# Patient Record
Sex: FEMALE | Race: ASIAN | NOT HISPANIC OR LATINO | URBAN - METROPOLITAN AREA
[De-identification: names, ages, dates, MRNs, and addresses within clinical notes are randomized per-mention and may not be internally consistent; named-entity substitution may affect disease eponyms.]

---

## 2021-02-08 ENCOUNTER — EMERGENCY (EMERGENCY)
Facility: HOSPITAL | Age: 61
LOS: 1 days | Discharge: HOME | End: 2021-02-08
Attending: EMERGENCY MEDICINE | Admitting: EMERGENCY MEDICINE
Payer: MEDICAID

## 2021-02-08 VITALS
TEMPERATURE: 98 F | RESPIRATION RATE: 18 BRPM | DIASTOLIC BLOOD PRESSURE: 65 MMHG | OXYGEN SATURATION: 98 % | HEART RATE: 85 BPM | SYSTOLIC BLOOD PRESSURE: 170 MMHG

## 2021-02-08 DIAGNOSIS — Z90.49 ACQUIRED ABSENCE OF OTHER SPECIFIED PARTS OF DIGESTIVE TRACT: Chronic | ICD-10-CM

## 2021-02-08 LAB
ALBUMIN SERPL ELPH-MCNC: 4.7 G/DL — SIGNIFICANT CHANGE UP (ref 3.3–5)
ALP SERPL-CCNC: 55 U/L — SIGNIFICANT CHANGE UP (ref 40–120)
ALT FLD-CCNC: 17 U/L — SIGNIFICANT CHANGE UP (ref 4–33)
ANION GAP SERPL CALC-SCNC: 11 MMOL/L — SIGNIFICANT CHANGE UP (ref 7–14)
AST SERPL-CCNC: 20 U/L — SIGNIFICANT CHANGE UP (ref 4–32)
BASOPHILS # BLD AUTO: 0.03 K/UL — SIGNIFICANT CHANGE UP (ref 0–0.2)
BASOPHILS NFR BLD AUTO: 0.5 % — SIGNIFICANT CHANGE UP (ref 0–2)
BILIRUB SERPL-MCNC: 0.4 MG/DL — SIGNIFICANT CHANGE UP (ref 0.2–1.2)
BUN SERPL-MCNC: 16 MG/DL — SIGNIFICANT CHANGE UP (ref 7–23)
CALCIUM SERPL-MCNC: 9.9 MG/DL — SIGNIFICANT CHANGE UP (ref 8.4–10.5)
CHLORIDE SERPL-SCNC: 105 MMOL/L — SIGNIFICANT CHANGE UP (ref 98–107)
CO2 SERPL-SCNC: 26 MMOL/L — SIGNIFICANT CHANGE UP (ref 22–31)
CREAT SERPL-MCNC: 0.69 MG/DL — SIGNIFICANT CHANGE UP (ref 0.5–1.3)
EOSINOPHIL # BLD AUTO: 0.02 K/UL — SIGNIFICANT CHANGE UP (ref 0–0.5)
EOSINOPHIL NFR BLD AUTO: 0.3 % — SIGNIFICANT CHANGE UP (ref 0–6)
GLUCOSE SERPL-MCNC: 106 MG/DL — HIGH (ref 70–99)
HCT VFR BLD CALC: 36.4 % — SIGNIFICANT CHANGE UP (ref 34.5–45)
HGB BLD-MCNC: 12.1 G/DL — SIGNIFICANT CHANGE UP (ref 11.5–15.5)
IANC: 4.74 K/UL — SIGNIFICANT CHANGE UP (ref 1.5–8.5)
IMM GRANULOCYTES NFR BLD AUTO: 0.2 % — SIGNIFICANT CHANGE UP (ref 0–1.5)
LYMPHOCYTES # BLD AUTO: 1.35 K/UL — SIGNIFICANT CHANGE UP (ref 1–3.3)
LYMPHOCYTES # BLD AUTO: 21 % — SIGNIFICANT CHANGE UP (ref 13–44)
MCHC RBC-ENTMCNC: 29.6 PG — SIGNIFICANT CHANGE UP (ref 27–34)
MCHC RBC-ENTMCNC: 33.2 GM/DL — SIGNIFICANT CHANGE UP (ref 32–36)
MCV RBC AUTO: 89 FL — SIGNIFICANT CHANGE UP (ref 80–100)
MONOCYTES # BLD AUTO: 0.28 K/UL — SIGNIFICANT CHANGE UP (ref 0–0.9)
MONOCYTES NFR BLD AUTO: 4.4 % — SIGNIFICANT CHANGE UP (ref 2–14)
NEUTROPHILS # BLD AUTO: 4.74 K/UL — SIGNIFICANT CHANGE UP (ref 1.8–7.4)
NEUTROPHILS NFR BLD AUTO: 73.6 % — SIGNIFICANT CHANGE UP (ref 43–77)
NRBC # BLD: 0 /100 WBCS — SIGNIFICANT CHANGE UP
NRBC # FLD: 0 K/UL — SIGNIFICANT CHANGE UP
PLATELET # BLD AUTO: 284 K/UL — SIGNIFICANT CHANGE UP (ref 150–400)
POTASSIUM SERPL-MCNC: 4.1 MMOL/L — SIGNIFICANT CHANGE UP (ref 3.5–5.3)
POTASSIUM SERPL-SCNC: 4.1 MMOL/L — SIGNIFICANT CHANGE UP (ref 3.5–5.3)
PROT SERPL-MCNC: 7.1 G/DL — SIGNIFICANT CHANGE UP (ref 6–8.3)
RBC # BLD: 4.09 M/UL — SIGNIFICANT CHANGE UP (ref 3.8–5.2)
RBC # FLD: 13.4 % — SIGNIFICANT CHANGE UP (ref 10.3–14.5)
SARS-COV-2 RNA SPEC QL NAA+PROBE: SIGNIFICANT CHANGE UP
SODIUM SERPL-SCNC: 142 MMOL/L — SIGNIFICANT CHANGE UP (ref 135–145)
TROPONIN T, HIGH SENSITIVITY RESULT: <6 NG/L — SIGNIFICANT CHANGE UP
TROPONIN T, HIGH SENSITIVITY RESULT: <6 NG/L — SIGNIFICANT CHANGE UP
WBC # BLD: 6.43 K/UL — SIGNIFICANT CHANGE UP (ref 3.8–10.5)
WBC # FLD AUTO: 6.43 K/UL — SIGNIFICANT CHANGE UP (ref 3.8–10.5)

## 2021-02-08 PROCEDURE — 71046 X-RAY EXAM CHEST 2 VIEWS: CPT | Mod: 26

## 2021-02-08 PROCEDURE — 99220: CPT

## 2021-02-08 RX ORDER — FENOFIBRATE,MICRONIZED 130 MG
134 CAPSULE ORAL DAILY
Refills: 0 | Status: DISCONTINUED | OUTPATIENT
Start: 2021-02-08 | End: 2021-02-09

## 2021-02-08 RX ORDER — DEXTROSE 50 % IN WATER 50 %
15 SYRINGE (ML) INTRAVENOUS ONCE
Refills: 0 | Status: DISCONTINUED | OUTPATIENT
Start: 2021-02-08 | End: 2021-02-11

## 2021-02-08 RX ORDER — SODIUM CHLORIDE 9 MG/ML
1000 INJECTION, SOLUTION INTRAVENOUS
Refills: 0 | Status: DISCONTINUED | OUTPATIENT
Start: 2021-02-08 | End: 2021-02-11

## 2021-02-08 RX ORDER — GLUCAGON INJECTION, SOLUTION 0.5 MG/.1ML
1 INJECTION, SOLUTION SUBCUTANEOUS ONCE
Refills: 0 | Status: DISCONTINUED | OUTPATIENT
Start: 2021-02-08 | End: 2021-02-11

## 2021-02-08 RX ORDER — DEXTROSE 50 % IN WATER 50 %
25 SYRINGE (ML) INTRAVENOUS ONCE
Refills: 0 | Status: DISCONTINUED | OUTPATIENT
Start: 2021-02-08 | End: 2021-02-11

## 2021-02-08 RX ORDER — DEXTROSE 50 % IN WATER 50 %
12.5 SYRINGE (ML) INTRAVENOUS ONCE
Refills: 0 | Status: DISCONTINUED | OUTPATIENT
Start: 2021-02-08 | End: 2021-02-11

## 2021-02-08 RX ORDER — INSULIN LISPRO 100/ML
VIAL (ML) SUBCUTANEOUS AT BEDTIME
Refills: 0 | Status: DISCONTINUED | OUTPATIENT
Start: 2021-02-08 | End: 2021-02-11

## 2021-02-08 RX ORDER — INSULIN LISPRO 100/ML
VIAL (ML) SUBCUTANEOUS
Refills: 0 | Status: DISCONTINUED | OUTPATIENT
Start: 2021-02-08 | End: 2021-02-11

## 2021-02-08 RX ORDER — ATORVASTATIN CALCIUM 80 MG/1
20 TABLET, FILM COATED ORAL AT BEDTIME
Refills: 0 | Status: DISCONTINUED | OUTPATIENT
Start: 2021-02-08 | End: 2021-02-11

## 2021-02-08 RX ORDER — ASPIRIN/CALCIUM CARB/MAGNESIUM 324 MG
81 TABLET ORAL DAILY
Refills: 0 | Status: DISCONTINUED | OUTPATIENT
Start: 2021-02-08 | End: 2021-02-11

## 2021-02-08 RX ADMIN — ATORVASTATIN CALCIUM 20 MILLIGRAM(S): 80 TABLET, FILM COATED ORAL at 20:39

## 2021-02-08 NOTE — ED PROVIDER NOTE - OBJECTIVE STATEMENT
60 y/f with PMH HTN, DM ,HLD presenting with chief c/o chest pain. Patient reports intermittent precordial chest pressure since last Thursday that radiates to the left shoulder and mid-back. Patient reports pain has been intermittent, began while cooking. She reports SOB with stairs since then. Symptoms improved with lying down. Associated symptoms include dry cough. (+) premature CAD in father at 59yo. (-)cigarette smoking. 60 y/f with PMH HTN, DM ,HLD presenting with chief c/o chest pain. Patient reports intermittent precordial chest pressure since last Thursday that radiates to the left shoulder and mid-back. Patient reports pain has been intermittent, began while cooking. She reports SOB with stairs since then. Symptoms improved with lying down. Associated symptoms include dry cough. (+) premature CAD in father at 61yo. (-)cigarette smoking.    Mandarin  Virgen [id number] utilized.

## 2021-02-08 NOTE — ED CDU PROVIDER INITIAL DAY NOTE - FAMILY HISTORY
Father  Still living? Unknown  Family history of coronary artery bypass graft, Age at diagnosis: Age Unknown   Father  Still living? Unknown  Family history of CVA, Age at diagnosis: Age Unknown  Family history of hyperglycemia, Age at diagnosis: Age Unknown  Family history of coronary artery bypass graft, Age at diagnosis: Age Unknown

## 2021-02-08 NOTE — ED CDU PROVIDER INITIAL DAY NOTE - ATTENDING CONTRIBUTION TO CARE
I have made the decision to admit this patient to the CDU as documented in my Provider note I have reviewed the note  written by Harsha DAS, on that visit day. I have supervised and participated as necessary in the performance of procedures indicated for patient management and was available at all phases of the patient´s visit when needed.    Please see my provider note for the details of my decision to admit  Vital Signs Last 24 Hrs  T(F): 97.7 HR: 72 BP: 110/70 RR: 15 SpO2: 100% (09 Feb 2021 14:10) (97% - 100%)  PE unchanged at time of admission  Plan  Patient with multiple risk factors to be admitted for serial trops echo and provocative testing

## 2021-02-08 NOTE — ED CDU PROVIDER INITIAL DAY NOTE - ENMT, MLM
Constipation.  Resolved with a fairly powerful laxative.  Now having some loose stool, like related to the laxative.  I recommend plenty of fluids.  Continue his regular diet.  The looser stools should now dissipate.  If he develops any constitutional symptoms such as fever, chills, cough, or other symptoms he is going to let us know.  For the hemorrhoids I recommend sitz bath, Preparation H, or just Vaseline ointment.   Airway patent. Nasal mucosa clear. Mouth with normal mucosa. Throat has no vesicles, no oropharyngeal exudates and uvula is midline.

## 2021-02-08 NOTE — ED PROVIDER NOTE - ATTENDING CONTRIBUTION TO CARE
I have seen and examined the patient on the patient´s visit date. I have reviewed the note written by Blake Treadwell [facility], on that visit day. I have supervised and participated as necessary in the performance of procedures indicated for patient management and was available at all phases of the patient´s visit when needed. We discussed the history, physical exam findings, mnagement plan, and  medical decision making. I have made my additons, exceptions, and revisions within the chart and I agree with H and P as documented in its entirety. The data and my interpretation of any data collected from labs, interventions and imaging appear below as well as my independent medical decision making and considerations    The patient is a 60y Female who has a past medical and surgery history of  HLD DM HTN History of appendectomy and history of premature CAD in father (had CABG at her age) PTED with chest pain as described   PTED with   Vital Signs Last 24 Hrs  T(F): 98 HR: 85 BP: 170/65 RR: 18 SpO2: 98% (08 Feb 2021 11:24) (98% - 98%)  PE: as described; my additions and exceptions are noted in the chart    DATA:  EKG:   LAB:                        12.1   6.43  )-----------( 284      ( 08 Feb 2021 13:43 )             36.4   Mean Cell Volume: 89.0 fL (02-08-21 @ 13:43 Auto Neutrophil %: 73.6 % (02-08-21 @ 13:43 Auto Eosinophil %: 0.3 % (02-08-21 @ 13:43)  02-08    142  |  105  |  16  ----------------------------<  106<H>  4.1   |  26  |  0.69    Ca    9.9      08 Feb 2021 13:43    TPro  7.1  /  Alb  4.7  /  TBili  0.4  /  DBili  x   /  AST  20  /  ALT  17  /  AlkPhos  55  02-08        IMPRESSION/RISK:  Dx=Chest pain    Consideration include  Plan  ASA  serial trops/EKG  Admit to minimum CDU based on risk factors/and results

## 2021-02-08 NOTE — ED PROVIDER NOTE - PROGRESS NOTE DETAILS
Eben DEGROOT: Given several risk factors for ACS patient was placed in CDU for further ischemic workup. Discussed with MIKAYLA Bui who has accepted patient.

## 2021-02-08 NOTE — ED CDU PROVIDER INITIAL DAY NOTE - MEDICAL DECISION MAKING DETAILS
61 yo non smoker F with PMHX of DM on metformin, HLD, HTN, here with c/o sternal to left sided chest pain radiating to back and left shoulder x 5 days, first occurred while reaching to the right side of her body with left arm while cooking, since then has been having SOB and CP with ambulating up her stairs, notes nausea with eating x 1 episode. however no cp/ sob associated with food intake. + sometimes dry cough in the evening time.  In ED labs wnl, trop <6, EKG NSR non ischemic. CXR. clear lungs   + risk factors, CAD in father at age 60.   CDU for tele, stress, echo and TDD eval tomorrow.

## 2021-02-08 NOTE — ED CDU PROVIDER INITIAL DAY NOTE - OBJECTIVE STATEMENT
60 y/f with PMH HTN, DM ,HLD presenting with chief c/o chest pain. Patient reports intermittent precordial chest pressure since last Thursday that radiates to the left shoulder and mid-back. Patient reports pain has been intermittent, began while cooking. She reports SOB with stairs since then. Symptoms improved with lying down. Associated symptoms include dry cough. (+) premature CAD in father at 59yo. (-)cigarette smoking.    CDU MIKAYLA Bui: [facility]  Jakob used for translation ID [id number]  59 yo non smoker F with PMHX of DM on metformin, HLD, HTN, here with c/o sternal to left sided chest pain radiating to back and left shoulder x 5 days, first occurred while reaching to the right side of her body with left arm while cooking, since then has been having SOB and CP with ambulating up her stairs, notes nausea with eating x 1 episode. however no cp/ sob associated with food intake. + sometimes dry cough in the evening time. No associated Diaphoresis, nausea or vomiting with pain. No lightheadedness or dizziness, no weakness numbness or tingling. Denies fever, chills, abdominal pain, travel leg swelling.

## 2021-02-08 NOTE — ED ADULT NURSE NOTE - OBJECTIVE STATEMENT
pt received to intake 4, aox3.  pt c/o intermittent chest pain with SOB on exertion x a few days..  SL placed, labs sent, pt appears comfortable.  awaiting further orders, will monitor

## 2021-02-09 VITALS
RESPIRATION RATE: 15 BRPM | HEART RATE: 72 BPM | DIASTOLIC BLOOD PRESSURE: 70 MMHG | TEMPERATURE: 98 F | OXYGEN SATURATION: 100 % | SYSTOLIC BLOOD PRESSURE: 110 MMHG

## 2021-02-09 PROCEDURE — 93018 CV STRESS TEST I&R ONLY: CPT | Mod: GC

## 2021-02-09 PROCEDURE — 99217: CPT

## 2021-02-09 PROCEDURE — 78452 HT MUSCLE IMAGE SPECT MULT: CPT | Mod: 26

## 2021-02-09 PROCEDURE — 93016 CV STRESS TEST SUPVJ ONLY: CPT | Mod: GC

## 2021-02-09 PROCEDURE — 99284 EMERGENCY DEPT VISIT MOD MDM: CPT

## 2021-02-09 PROCEDURE — 93306 TTE W/DOPPLER COMPLETE: CPT | Mod: 26

## 2021-02-09 RX ORDER — FENOFIBRATE,MICRONIZED 130 MG
145 CAPSULE ORAL DAILY
Refills: 0 | Status: DISCONTINUED | OUTPATIENT
Start: 2021-02-09 | End: 2021-02-09

## 2021-02-09 RX ORDER — FENOFIBRATE,MICRONIZED 130 MG
145 CAPSULE ORAL DAILY
Refills: 0 | Status: DISCONTINUED | OUTPATIENT
Start: 2021-02-09 | End: 2021-02-11

## 2021-02-09 RX ADMIN — Medication 145 MILLIGRAM(S): at 12:03

## 2021-02-09 RX ADMIN — Medication 81 MILLIGRAM(S): at 12:03

## 2021-02-09 RX ADMIN — Medication 1: at 13:52

## 2021-02-09 NOTE — CONSULT NOTE ADULT - ASSESSMENT
Patient is a 61 yo Mandarin-speaking woman with HTN, hyperlipidemia, DM2, and significant family history of early CAD (father, early 50s) presented to the [facility] ED with complaints of progressively worsening substernal chest pressure, at times related to exertion.  Denies fevers/chills, cough, orthopnea/PND, edema, syncope.No tobacco or drug use. No recent illness. No history of DVT or PE.    12-lead ECG and bedside telemetry --> SR, no events  Cardiac biomarkers flat.  COVID PCR negative.  CXR unremarkable for acute findings.    At the time of my evaluation, the patient appeared clinically and hemodynamically stable.  Unremarkable physical exam.  She appeared euvolemic on my exam.    Patient already scheduled for echocardiogram and nuclear stress test by the CDU team.    Echocardiogram noted normal biventricular systolic function, mild pulmonary HTN.  No evidence of ischemia noted on the nuclear stress test.    From the cardiac perspective, the patient may be discharged to home without the need for additional inpatient cardiac testing.  She can f/u with me within 1-2 weeks for further evaluation as needed.

## 2021-02-09 NOTE — ED CDU PROVIDER DISPOSITION NOTE - CARE PROVIDER_API CALL
Moody Perez (MD; PhD)  Cardiology; Internal Medicine; Vascular Medicine  [id number] [place] [address], [id number]  [place] [address]  Phone: [phone]  Fax: [phone]  Follow Up Time: 7-10 Days

## 2021-02-09 NOTE — ED CDU PROVIDER SUBSEQUENT DAY NOTE - FAMILY HISTORY
Father  Still living? Unknown  Family history of CVA, Age at diagnosis: Age Unknown  Family history of hyperglycemia, Age at diagnosis: Age Unknown  Family history of coronary artery bypass graft, Age at diagnosis: Age Unknown

## 2021-02-09 NOTE — ED CDU PROVIDER DISPOSITION NOTE - NSFOLLOWUPINSTRUCTIONS_ED_ALL_ED_FT
Follow up with your primary care doctor within 48-72 hours for a post-hospital visit. Show copies of your reports given to you. Follow up with Dr. Perez (Cardiology) in 1-2 weeks for further evaluation. We recommend you take Aspirin 81mg over the counter daily until further evaluation.  Take all of your other medications as previously prescribed. Worsening or continued chest pain, shortness of breath, weakness,  or any other concerns return to ED.

## 2021-02-09 NOTE — ED CDU PROVIDER DISPOSITION NOTE - CLINICAL COURSE
61 yo non smoker F with PMHX of DM on metformin, HLD, HTN, here with c/o sternal to left sided chest pain radiating to back and left shoulder x 5 days. +Fam hx of early CAD, sent to CDU for tele doc eval, stress and echo. Echo c/w mild pulm HTN, stress test wnl. Pt seen and cleared by Dr. Perez for outpatient follow up.

## 2021-02-09 NOTE — CONSULT NOTE ADULT - SUBJECTIVE AND OBJECTIVE BOX
Cardiology/Vascular Medicine Inpatient Consultation Note    HISTORY OF PRESENT ILLNESS:    Patient is a 59 yo Mandarin-speaking woman with HTN, hyperlipidemia, DM2, and significant family history of early CAD (father, early 50s) presented to the [facility] ED with complaints of progressively worsening substernal chest pressure, at times related to exertion.  Denies fevers/chills, cough, orthopnea/PND, edema, syncope.No tobacco or drug use. No recent illness. No history of DVT or PE.    12-lead ECG and bedside telemetry --> SR, no events  Cardiac biomarkers flat.  COVID PCR negative.  CXR unremarkable for acute findings.    At the time of my evaluation, the patient appeared clinically and hemodynamically stable.  Unremarkable physical exam.  She appeared euvolemic on my exam.    Patient already scheduled for echocardiogram and nuclear stress test by the CDU team.    Echocardiogram noted normal biventricular systolic function, mild pulmonary HTN.  No evidence of ischemia noted on the nuclear stress test.    From the cardiac perspective, the patient may be discharged to home without the need for additional inpatient cardiac testing.  She can f/u with me within 1-2 weeks for further evaluation as needed.      Allergies  No Known Allergies    MEDICATIONS:  aspirin enteric coated 81 milliGRAM(s) Oral daily  atorvastatin 20 milliGRAM(s) Oral at bedtime  dextrose 40% Gel 15 Gram(s) Oral once  dextrose 50% Injectable 25 Gram(s) IV Push once  dextrose 50% Injectable 12.5 Gram(s) IV Push once  dextrose 50% Injectable 25 Gram(s) IV Push once  fenofibrate Tablet 145 milliGRAM(s) Oral daily  glucagon  Injectable 1 milliGRAM(s) IntraMuscular once  insulin lispro (ADMELOG) corrective regimen sliding scale   SubCutaneous three times a day before meals  insulin lispro (ADMELOG) corrective regimen sliding scale   SubCutaneous at bedtime  dextrose 5%. 1000 milliLiter(s) IV Continuous <Continuous>  dextrose 5%. 1000 milliLiter(s) IV Continuous <Continuous>    PAST MEDICAL & SURGICAL HISTORY:  HLD (hyperlipidemia)  DM (diabetes mellitus)  HTN (hypertension)  History of appendectomy    FAMILY HISTORY:  Family history of hyperglycemia (Father)  Family history of CVA (Father)  Family history of coronary artery bypass graft (Father)    SOCIAL HISTORY:    As above, as per chart notes    REVIEW OF SYSTEMS:  As above, as per chart notes    PHYSICAL EXAM:  T(C): 36.7 (02-09-21 @ 10:33), Max: 37 (21 @ 01:18)  HR: 65 (21 @ 10:33) (64 - 84)  BP: 120/75 (21 @ 10:33) (104/58 - 156/95)  RR: 17 (21 @ 10:33) (16 - 17)  SpO2: 100% (21 @ 10:33) (97% - 100%)    Appearance: NAD  HEENT:   No JVD  Cardiovascular: Normal S1 S2, No JVD, No murmurs, No edema  Respiratory: Lungs clear to auscultation	  Psychiatry: Awake, alert  Gastrointestinal:  Soft, Non-tender, + BS	  Neurologic: Non-focal  Extremities: No edema      LABS:	 	                          12.1   6.43  )-----------( 284      ( 2021 13:43 )             36.4     02    142  |  105  |  16  ----------------------------<  106<H>  4.1   |  26  |  0.69    Ca    9.9      2021 13:43    TPro  7.1  /  Alb  4.7  /  TBili  0.4  /  DBili  x   /  AST  20  /  ALT  17  /  AlkPhos  55  -08    < from: Xray Chest 2 Views PA/Lat (21 @ 13:59) >    EXAM:  XR CHEST PA LAT 2V        PROCEDURE DATE:  2021         INTERPRETATION:  CLINICAL INDICATION: chest pain    EXAM:  Frontal and lateral chest from 2021 at 1359. No prior chest x-ray available at this institution for comparison.    IMPRESSION:  Clear lungs. No pleural effusions or pneumothorax.    Cardiac and mediastinal silhouettes within normal limits.    Trachea midline.    Unremarkable osseous structures.              JAMIE DING MD; Attending Radiologist  This document has been electronically signed. 2021  3:10PM    < end of copied text >  < from: Transthoracic Echocardiogram (21 @ 06:45) >    Patient name: TRACY ERIC  YOB: 1960   Age: 60 (F)   MR#: [id number]  Study Date: 2021  Location: O/PSonographer: MARY Vigil  Study quality: Technically good  Referring Physician: Not Available Doctor, MD  Blood Pressure: 104/58 mmHg  Height: 152 cm  Weight: 47 kg  BSA: 1.4 m2  ------------------------------------------------------------------------  PROCEDURE: Transthoracic echocardiogram with 2-D, M-Mode  and complete spectral and color flow Doppler.  INDICATION: Chest pain, unspecified (R07.9)  ------------------------------------------------------------------------  DIMENSIONS:  Dimensions:     Normal Values:  LA:     2.8 cm    2.0 - 4.0 cm  Ao:     2.6 cm    2.0 - 3.8 cm  SEPTUM: 0.6 cm    0.6 - 1.2 cm  PWT:    0.7 cm    0.6 - 1.1 cm  LVIDd:  4.0 cm    3.0 - 5.6 cm  LVIDs:  2.6 cm    1.8 - 4.0 cm  Derived Variables:  LVMI: 51 g/m2  RWT: 0.35  Fractional short: 35 %  Ejection Fraction (Teicholtz): 65 %  ------------------------------------------------------------------------  OBSERVATIONS:  Mitral Valve: Normal mitral valve. Mild mitral  regurgitation.  Aortic Root: Normal aortic root.  Aortic Valve: Normal trileaflet aortic valve.  Left Atrium: Normal left atrium.  Left Ventricle: Normal left ventricular systolic function.  No segmental wall motion abnormalities. Normal left  ventricular internal dimensions and wall thicknesses.  Right Heart: Normal right atrium. Normal right ventricular  size and function. Normal tricuspid valve.   Mild tricuspid  regurgitation. Normal pulmonic valve. Minimal pulmonic  regurgitation.  Pericardium/PleuraNormal pericardium with no pericardial  effusion.  Hemodynamic: Estimated right ventricular systolic pressure  equals 40 mm Hg, assuming right atrial pressure equals 10  mm Hg, consistent with mild pulmonary hypertension.  ------------------------------------------------------------------------  CONCLUSIONS:  1. Normal mitral valve. Mild mitral regurgitation.  2. Normal left ventricular internal dimensions and wall  thicknesses.  3. Normal left ventricular systolic function. No segmental  wall motion abnormalities.  4. Normal right ventricular size and function.  5. Estimated right ventricular systolic pressure equals 40  mm Hg, assuming right atrial pressure equals 10 mm Hg,  consistent with mild pulmonary hypertension.  ------------------------------------------------------------------------  Confirmed on  2021 - 08:09:31 by Delio Muniz M.D.,  [facility]  ------------------------------------------------------------------------    < end of copied text >  < from: Nuclear Stress Test-Exercise (Nuclear Stress Test-Exercise .) (21 @ 08:54) >    PATIENT: TRACY ERIC  : 1960   AGE: 60 (F)   MR#: [id number]  STUDY DATE: 2021  LOCATION: O/P  REF. PHYSICIAN(S): PAWEL GRAVES / Moody Perez MD, PhD  FELLOW: Yanelis Rosado NP, NP  ------------------------------------------------------------------------  TYPE OF TEST: Stress SESTAMIBI  INDICATION: Other chest pain (R07.89), Shortness of breath  (R06.02)  ------------------------------------------------------------------------  HISTORY:  CARDIAC HISTORY: 60 years old female with PMH of HTN, DM,  HLD, family hx of early CAD presents for ischemic  evaluation due to CP and SOB.  RISK FACTORS: Diabetes, Elevated Cholesterol,  Hypertension, Post Menopausal, Family History  MEDICATIONS: ASA, Tricor, Lipitor  ------------------------------------------------------------------------  BASELINE ELECTROCARDIOGRAM:  Rhythm: Normal Sinus Rhythm - 81 BPM  ST: No significant ST abnormalities.  ------------------------------------------------------------------------  EXERCISE RESULTS:  TIME      SPEED  GRADE  HR      BP  Baseline  Standing   N/A  81  137/80  03:00     1.7 MPH    10% 120  147/84  06:00     2.5 MPH    12% 142  178/90  02:00     Recovery        96  149/80  04:00     Recovery        93  123/73  05:00     Recovery        86  143/79  ------------------------------------------------------------------------  Protocol: Lauro   Exercise Duration: 6: 00 Min  HR: Baseline HR: 81 bpm   Peak HR: 142 bpm (88% of MPHR)  MPHR: 161 bpm   85% of MPHR: 137 bpm  BP: Baseline BP: 137/80 mmHg   Peak BP: 178/90 mmHg   Peak  RPP: 30919 (Rate Pressure Product)  Last Caffeine intake: 12 hrs  Terminated: Fatigue  Performance: 6 METS, Poor for age and gender.  Comments: The patient walked on treadmill, target heart  rate was achieved.  ------------------------------------------------------------------------  SYMPTOMS/FINDINGS:  Symptoms: non-anginal chest pain  Chest pain: No chest pain with exercise  Treatment: None  ------------------------------------------------------------------------  ECG ABNORMALITIES DURING/AFTER STRESS:   Abnormalities: None   ST Changes:non-specific T wave abnormality:  in leads V4,  V5  ------------------------------------------------------------------------  NEW ARRHYTHMIAS DEVELOPED DURING/AFTER STRESS:  None  ------------------------------------------------------------------------  STRESS TEST IMPRESSIONS:  Exercise capacity: 6 METS, Poor for age and gender. 88% of  MPHR.  Chest Pain: No chest pain with exercise.  Symptom: non-anginal chest pain.  HR Response: Appropriate.  BP Response: Hypertensive response (excessive increase in  systolic/diastolic BP).  Heart Rhythm: Normal Sinus Rhythm - 81 BPM.  Baseline ECG: No significant ST abnormalities.  ECG Abnormalities: None.  ECG Changes: non-specific T wave abnormality:  in leads  V4, V5.  Arrhythmia: None.  Duke Score: 6  ------------------------------------------------------------------------  PROCEDURE:  7.16 mCi of Tc99m Sestamibi were injected during stress  protocol. Approximately 45 minutes later, tomographic  images were obtained in a 180 degree arc from right  anterior oblique to left anterior oblique with 64 stops.  The tomographic slices were reconstructed in 3 orthogonal  planes (short axis, horizontal long axis and vertical long  axis).  Interpretation was performed both by visual and  quantitative analysis.  Stress images were acquired using CZT-based system with  pinhole collimation ([employer] 530 c, [employer]),  and reconstructed using MLEM algorithm. Images were  re-acquired with the patient in a prone position.  ------------------------------------------------------------------------  NUCLEAR FINDINGS:  Review of raw data shows: The study is of good technical  quality.  The left ventricle was small in size. Normal myocardial  perfusion scan,with no evidence of infarction or inducible  ischemia.  ------------------------------------------------------------------------  GATED ANALYSIS:  Post-stress gated wall motion analysis was performed (LVEF  > 70%;LVEDV = 46 ml.), revealing normal systolic LV  function. There was no segmental wall motion abnormality.  RV function appeared normal.  ------------------------------------------------------------------------  IMPRESSIONS:Normal Study  * Myocardial Perfusion SPECT results are normal at 88 % of  MPHR.  * Review of raw data shows: The study is of good technical  quality.  * The left ventricle was small in size. Normal myocardial  perfusion scan,with no evidence of infarction or inducible  ischemia.  * Post-stress gated wall motion analysis was performed  (LVEF > 70%;LVEDV = 46 ml.), revealing normal systolic LV  function. There was no segmental wall motion abnormality.  RV function appeared normal.  ------------------------------------------------------------------------  Confirmed on  2021 - 11:39:00 by [place] M.D.  ------------------------------------------------------------------------    < end of copied text >

## 2021-02-09 NOTE — ED CDU PROVIDER DISPOSITION NOTE - PATIENT PORTAL LINK FT
You can access the FollowMyHealth Patient Portal offered by [facility] by registering at the following website: [email or web address]. By joining [employer]’s FollowMyHealth portal, you will also be able to view your health information using other applications (apps) compatible with our system.

## 2021-02-09 NOTE — ED CDU PROVIDER SUBSEQUENT DAY NOTE - PROGRESS NOTE DETAILS
Pt reassessed, no complaints this morning. Has been in NSR on tele. Echo consistent w/ mild pulm. HTN, stress negative. Pt seen by Dr. Perez, cleared for cardiology follow up in 1-2 weeks. Will get a language line ipad Mandarin  to assist in providing dc papers. Mandarin  ID #[id number] used to provide dc instructions. Pt discharged and left prior to attending seeing pt. pt stable and discharged by acp

## 2021-02-09 NOTE — ED CDU PROVIDER SUBSEQUENT DAY NOTE - HISTORY
patient presenting with chest pain accepted to CDU for telemonitoring, in interim patient with no acute complaint, no tele events, resting comfortably. patient is pending stress test in AM

## 2021-02-23 PROBLEM — E11.9 TYPE 2 DIABETES MELLITUS WITHOUT COMPLICATIONS: Chronic | Status: ACTIVE | Noted: 2021-02-08

## 2021-02-23 PROBLEM — I10 ESSENTIAL (PRIMARY) HYPERTENSION: Chronic | Status: ACTIVE | Noted: 2021-02-08

## 2021-02-23 PROBLEM — Z00.00 ENCOUNTER FOR PREVENTIVE HEALTH EXAMINATION: Status: ACTIVE | Noted: 2021-02-23

## 2021-02-23 PROBLEM — E78.5 HYPERLIPIDEMIA, UNSPECIFIED: Chronic | Status: ACTIVE | Noted: 2021-02-08

## 2021-03-02 DIAGNOSIS — Z87.19 PERSONAL HISTORY OF OTHER DISEASES OF THE DIGESTIVE SYSTEM: ICD-10-CM

## 2021-03-02 DIAGNOSIS — Z87.891 PERSONAL HISTORY OF NICOTINE DEPENDENCE: ICD-10-CM

## 2021-03-02 DIAGNOSIS — E53.8 DEFICIENCY OF OTHER SPECIFIED B GROUP VITAMINS: ICD-10-CM

## 2021-03-02 DIAGNOSIS — Z78.9 OTHER SPECIFIED HEALTH STATUS: ICD-10-CM

## 2021-03-02 DIAGNOSIS — Z82.49 FAMILY HISTORY OF ISCHEMIC HEART DISEASE AND OTHER DISEASES OF THE CIRCULATORY SYSTEM: ICD-10-CM

## 2021-03-02 RX ORDER — FENOFIBRATE 134 MG/1
134 CAPSULE ORAL DAILY
Refills: 0 | Status: ACTIVE | COMMUNITY

## 2021-03-02 RX ORDER — ATORVASTATIN CALCIUM 20 MG/1
20 TABLET, FILM COATED ORAL DAILY
Refills: 0 | Status: ACTIVE | COMMUNITY

## 2021-03-02 RX ORDER — OMEPRAZOLE 40 MG/1
40 CAPSULE, DELAYED RELEASE ORAL DAILY
Refills: 0 | Status: ACTIVE | COMMUNITY

## 2021-03-02 RX ORDER — METFORMIN ER 750 MG 750 MG/1
750 TABLET ORAL DAILY
Refills: 0 | Status: ACTIVE | COMMUNITY

## 2021-03-10 ENCOUNTER — APPOINTMENT (OUTPATIENT)
Dept: CARDIOLOGY | Facility: CLINIC | Age: 61
End: 2021-03-10
Payer: MEDICAID

## 2021-03-10 VITALS
SYSTOLIC BLOOD PRESSURE: 122 MMHG | DIASTOLIC BLOOD PRESSURE: 79 MMHG | OXYGEN SATURATION: 97 % | HEART RATE: 75 BPM | HEIGHT: 62 IN | BODY MASS INDEX: 20.98 KG/M2 | TEMPERATURE: 98.1 F | WEIGHT: 114 LBS | RESPIRATION RATE: 16 BRPM

## 2021-03-10 DIAGNOSIS — R07.9 CHEST PAIN, UNSPECIFIED: ICD-10-CM

## 2021-03-10 DIAGNOSIS — E11.9 TYPE 2 DIABETES MELLITUS W/OUT COMPLICATIONS: ICD-10-CM

## 2021-03-10 DIAGNOSIS — E78.5 HYPERLIPIDEMIA, UNSPECIFIED: ICD-10-CM

## 2021-03-10 DIAGNOSIS — Z13.6 ENCOUNTER FOR SCREENING FOR CARDIOVASCULAR DISORDERS: ICD-10-CM

## 2021-03-10 DIAGNOSIS — R06.00 DYSPNEA, UNSPECIFIED: ICD-10-CM

## 2021-03-10 DIAGNOSIS — I10 ESSENTIAL (PRIMARY) HYPERTENSION: ICD-10-CM

## 2021-03-10 PROCEDURE — 99214 OFFICE O/P EST MOD 30 MIN: CPT

## 2021-03-10 PROCEDURE — 99072 ADDL SUPL MATRL&STAF TM PHE: CPT

## 2021-03-10 PROCEDURE — 93000 ELECTROCARDIOGRAM COMPLETE: CPT

## 2021-03-21 ENCOUNTER — NON-APPOINTMENT (OUTPATIENT)
Age: 61
End: 2021-03-21

## 2021-03-21 PROBLEM — E78.5 HLD (HYPERLIPIDEMIA): Status: ACTIVE | Noted: 2021-03-02

## 2021-03-21 PROBLEM — Z13.6 SCREENING FOR CARDIOVASCULAR CONDITION: Status: ACTIVE | Noted: 2021-03-21

## 2021-03-21 PROBLEM — R06.00 EXERTIONAL DYSPNEA: Status: ACTIVE | Noted: 2021-03-21

## 2021-03-21 PROBLEM — E11.9 DIABETES: Status: ACTIVE | Noted: 2021-03-02

## 2021-03-21 PROBLEM — R07.9 CHEST PAIN: Status: ACTIVE | Noted: 2021-03-02

## 2021-03-21 PROBLEM — I10 HTN (HYPERTENSION): Status: ACTIVE | Noted: 2021-03-02

## 2022-11-14 NOTE — ED ADULT NURSE NOTE - NSFALLRSKASSESASSIST_ED_ALL_ED
no For information on Fall & Injury Prevention, visit: [email or web address] OR  [email or web address] OR  https://www.cdc.gov/steadi/patient.html

## 2023-09-14 ENCOUNTER — HOSPITAL ENCOUNTER (EMERGENCY)
Age: 63
LOS: 1 days | Discharge: HOME | End: 2023-09-15
Payer: COMMERCIAL

## 2023-09-14 VITALS — HEART RATE: 62 BPM | RESPIRATION RATE: 16 BRPM | OXYGEN SATURATION: 99 %

## 2023-09-14 VITALS
OXYGEN SATURATION: 99 % | DIASTOLIC BLOOD PRESSURE: 85 MMHG | HEART RATE: 75 BPM | SYSTOLIC BLOOD PRESSURE: 180 MMHG | TEMPERATURE: 97.9 F | RESPIRATION RATE: 18 BRPM

## 2023-09-14 VITALS — BODY MASS INDEX: 21.7 KG/M2

## 2023-09-14 DIAGNOSIS — K63.89: Primary | ICD-10-CM

## 2023-09-14 DIAGNOSIS — R10.9: ICD-10-CM

## 2023-09-14 LAB
ADD MANUAL DIFF / SLIDE REVIEW: NO
ALBUMIN SERPL-MCNC: 4.7 G/DL (ref 3.5–5)
ALBUMIN/GLOB SERPL: 1.3 {RATIO} (ref 1–2.8)
ALP SERPL-CCNC: 71 U/L (ref 38–126)
ALT SERPL-CCNC: 24 IU/L (ref ?–35)
BUN SERPL-MCNC: 14 MG/DL (ref 7–17)
CALCIUM SERPL-MCNC: 10.3 MG/DL (ref 8.4–10.2)
CHLORIDE SERPL-SCNC: 100 MMOL/L (ref 98–107)
CO2 SERPL-SCNC: 30 MMOL/L (ref 22–32)
ESTIMATED GLOMERULAR FILT RATE: > 60 ML/MIN (ref 60–?)
GLOBULIN SER CALC-MCNC: 3.6 G/DL (ref 1.7–4.1)
GLUCOSE SERPL-MCNC: 157 MG/DL (ref 80–110)
HEMATOCRIT: 38.7 % (ref 36–46)
HEMOGLOBIN: 13 G/DL (ref 12–16)
HEMOLYSIS: < 15 (ref 0–50)
LIPASE SERPL-CCNC: 189 U/L (ref 23–300)
LYMPHOCYTES # SPEC AUTO: 2500 /UL (ref 1100–4500)
MCV RBC: 87.5 FL (ref 80–100)
MEAN CORPUSCULAR HEMOGLOBIN: 29.4 PG (ref 26–34)
MEAN CORPUSCULAR HGB CONC: 33.6 % (ref 30–36)
PLATELET COUNT: 267 X10^3/UL (ref 150–400)
POTASSIUM SERPL-SCNC: 4.1 MMOL/L (ref 3.4–5.1)
PROT SERPL-MCNC: 8.3 G/DL (ref 6.3–8.2)
SODIUM SERPL-SCNC: 138 MMOL/L (ref 137–145)

## 2023-09-14 PROCEDURE — C9113 INJ PANTOPRAZOLE SODIUM, VIA: HCPCS

## 2023-09-14 PROCEDURE — 81003 URINALYSIS AUTO W/O SCOPE: CPT

## 2023-09-14 PROCEDURE — 99284 EMERGENCY DEPT VISIT MOD MDM: CPT

## 2023-09-14 PROCEDURE — 85025 COMPLETE CBC W/AUTO DIFF WBC: CPT

## 2023-09-14 PROCEDURE — 74177 CT ABD & PELVIS W/CONTRAST: CPT

## 2023-09-14 PROCEDURE — 80053 COMPREHEN METABOLIC PANEL: CPT

## 2023-09-14 PROCEDURE — 93005 ELECTROCARDIOGRAM TRACING: CPT

## 2023-09-14 PROCEDURE — 96375 TX/PRO/DX INJ NEW DRUG ADDON: CPT

## 2023-09-14 PROCEDURE — 93010 ELECTROCARDIOGRAM REPORT: CPT

## 2023-09-14 PROCEDURE — 83690 ASSAY OF LIPASE: CPT

## 2023-09-14 PROCEDURE — 96374 THER/PROPH/DIAG INJ IV PUSH: CPT

## 2023-09-14 PROCEDURE — 36415 COLL VENOUS BLD VENIPUNCTURE: CPT

## 2023-09-15 VITALS
HEART RATE: 60 BPM | RESPIRATION RATE: 22 BRPM | OXYGEN SATURATION: 95 % | SYSTOLIC BLOOD PRESSURE: 119 MMHG | DIASTOLIC BLOOD PRESSURE: 69 MMHG

## 2023-09-15 VITALS
OXYGEN SATURATION: 97 % | DIASTOLIC BLOOD PRESSURE: 72 MMHG | SYSTOLIC BLOOD PRESSURE: 129 MMHG | RESPIRATION RATE: 13 BRPM | HEART RATE: 61 BPM | TEMPERATURE: 97.3 F

## 2023-09-15 VITALS
DIASTOLIC BLOOD PRESSURE: 65 MMHG | SYSTOLIC BLOOD PRESSURE: 126 MMHG | OXYGEN SATURATION: 96 % | RESPIRATION RATE: 13 BRPM | HEART RATE: 57 BPM

## 2023-09-15 VITALS
HEART RATE: 70 BPM | DIASTOLIC BLOOD PRESSURE: 79 MMHG | OXYGEN SATURATION: 99 % | RESPIRATION RATE: 16 BRPM | SYSTOLIC BLOOD PRESSURE: 163 MMHG

## 2023-09-15 VITALS — HEART RATE: 69 BPM | OXYGEN SATURATION: 99 % | RESPIRATION RATE: 16 BRPM

## 2023-09-15 VITALS
SYSTOLIC BLOOD PRESSURE: 157 MMHG | HEART RATE: 62 BPM | DIASTOLIC BLOOD PRESSURE: 77 MMHG | RESPIRATION RATE: 14 BRPM | OXYGEN SATURATION: 95 %

## 2023-09-15 VITALS
RESPIRATION RATE: 16 BRPM | OXYGEN SATURATION: 97 % | HEART RATE: 62 BPM | DIASTOLIC BLOOD PRESSURE: 70 MMHG | SYSTOLIC BLOOD PRESSURE: 134 MMHG

## 2023-09-15 VITALS
DIASTOLIC BLOOD PRESSURE: 70 MMHG | SYSTOLIC BLOOD PRESSURE: 123 MMHG | RESPIRATION RATE: 15 BRPM | OXYGEN SATURATION: 96 % | HEART RATE: 60 BPM